# Patient Record
Sex: MALE
[De-identification: names, ages, dates, MRNs, and addresses within clinical notes are randomized per-mention and may not be internally consistent; named-entity substitution may affect disease eponyms.]

---

## 2017-12-26 ENCOUNTER — HOSPITAL ENCOUNTER (EMERGENCY)
Dept: HOSPITAL 31 - C.ER | Age: 62
Discharge: HOME | End: 2017-12-26
Payer: MEDICARE

## 2017-12-26 VITALS — HEART RATE: 62 BPM | DIASTOLIC BLOOD PRESSURE: 79 MMHG | SYSTOLIC BLOOD PRESSURE: 117 MMHG | OXYGEN SATURATION: 100 %

## 2017-12-26 VITALS — TEMPERATURE: 97.9 F | RESPIRATION RATE: 18 BRPM

## 2017-12-26 VITALS — BODY MASS INDEX: 27.3 KG/M2

## 2017-12-26 DIAGNOSIS — M54.5: ICD-10-CM

## 2017-12-26 DIAGNOSIS — N40.0: Primary | ICD-10-CM

## 2017-12-26 LAB
ALBUMIN/GLOB SERPL: 1.3 {RATIO} (ref 1–2.1)
ALP SERPL-CCNC: 83 U/L (ref 38–126)
ALT SERPL-CCNC: 54 U/L (ref 21–72)
AST SERPL-CCNC: 44 U/L (ref 17–59)
BASOPHILS # BLD AUTO: 0.1 K/UL (ref 0–0.2)
BASOPHILS NFR BLD: 0.6 % (ref 0–2)
BILIRUB SERPL-MCNC: 0.8 MG/DL (ref 0.2–1.3)
BILIRUB UR-MCNC: NEGATIVE MG/DL
BUN SERPL-MCNC: 16 MG/DL (ref 9–20)
CALCIUM SERPL-MCNC: 8.7 MG/DL (ref 8.6–10.4)
CHLORIDE SERPL-SCNC: 99 MMOL/L (ref 98–107)
CO2 SERPL-SCNC: 29 MMOL/L (ref 22–30)
EOSINOPHIL # BLD AUTO: 0.6 K/UL (ref 0–0.7)
EOSINOPHIL NFR BLD: 6.5 % (ref 0–4)
ERYTHROCYTE [DISTWIDTH] IN BLOOD BY AUTOMATED COUNT: 13.7 % (ref 11.5–14.5)
GLOBULIN SER-MCNC: 3.4 GM/DL (ref 2.2–3.9)
GLUCOSE SERPL-MCNC: 91 MG/DL (ref 75–110)
GLUCOSE UR STRIP-MCNC: NORMAL MG/DL
HCT VFR BLD CALC: 47.1 % (ref 35–51)
KETONES UR STRIP-MCNC: NEGATIVE MG/DL
LEUKOCYTE ESTERASE UR-ACNC: (no result) LEU/UL
LYMPHOCYTES # BLD AUTO: 2.2 K/UL (ref 1–4.3)
LYMPHOCYTES NFR BLD AUTO: 23 % (ref 20–40)
MCH RBC QN AUTO: 32 PG (ref 27–31)
MCHC RBC AUTO-ENTMCNC: 34.7 G/DL (ref 33–37)
MCV RBC AUTO: 92.4 FL (ref 80–94)
MONOCYTES # BLD: 1 K/UL (ref 0–0.8)
MONOCYTES NFR BLD: 10.5 % (ref 0–10)
NRBC BLD AUTO-RTO: 0 % (ref 0–2)
PH UR STRIP: 5 [PH] (ref 5–8)
PLATELET # BLD: 211 K/UL (ref 130–400)
PMV BLD AUTO: 8.4 FL (ref 7.2–11.7)
POTASSIUM SERPL-SCNC: 4.5 MMOL/L (ref 3.6–5.2)
PROT SERPL-MCNC: 7.9 G/DL (ref 6.3–8.3)
PROT UR STRIP-MCNC: NEGATIVE MG/DL
RBC # UR STRIP: (no result) /UL
RBC #/AREA URNS HPF: 1 /HPF (ref 0–3)
SODIUM SERPL-SCNC: 135 MMOL/L (ref 132–148)
SP GR UR STRIP: 1.01 (ref 1–1.03)
UROBILINOGEN UR-MCNC: NORMAL MG/DL (ref 0.2–1)
WBC # BLD AUTO: 9.5 K/UL (ref 4.8–10.8)
WBC #/AREA URNS HPF: 1 /HPF (ref 0–5)

## 2017-12-26 PROCEDURE — 81001 URINALYSIS AUTO W/SCOPE: CPT

## 2017-12-26 PROCEDURE — 87086 URINE CULTURE/COLONY COUNT: CPT

## 2017-12-26 PROCEDURE — 85025 COMPLETE CBC W/AUTO DIFF WBC: CPT

## 2017-12-26 PROCEDURE — 99284 EMERGENCY DEPT VISIT MOD MDM: CPT

## 2017-12-26 PROCEDURE — 74176 CT ABD & PELVIS W/O CONTRAST: CPT

## 2017-12-26 PROCEDURE — 80053 COMPREHEN METABOLIC PANEL: CPT

## 2017-12-26 PROCEDURE — 96360 HYDRATION IV INFUSION INIT: CPT

## 2017-12-26 NOTE — C.PDOC
History Of Present Illness


Pt c/o right lower back pain.


Time Seen by Provider: 12/26/17 13:55


Chief Complaint (Nursing): Back Pain


History Per: Patient


Onset/Duration Of Symptoms: Days (about 1-2 weeks)


Current Symptoms Are (Timing): Still Present


Quality Of Discomfort: "Pain"


Severity: Moderate


Associated Symptoms: denies: New Weakness, New Numbness


Exacerbating Factor(s): Turning, Movement


Additional History Per: Prior Records





Past Medical History


Reviewed: Historical Data, Nursing Documentation, Vital Signs


Vital Signs: 





 Last Vital Signs











Temp  97.9 F   12/26/17 12:50


 


Pulse  79   12/26/17 12:50


 


Resp  18   12/26/17 12:50


 


BP  132/85   12/26/17 12:50


 


Pulse Ox  97   12/26/17 12:50














- Medical History


PMH: Kidney Stones


Family History: States: Unknown Family Hx





- Social History


Hx Alcohol Use: No


Hx Substance Use: No





- Immunization History


Hx Tetanus Toxoid Vaccination: No


Hx Influenza Vaccination: No





Review Of Systems


Except As Marked, All Systems Reviewed And Found Negative.


Constitutional: Negative for: Fever


Cardiovascular: Negative for: Chest Pain


Respiratory: Negative for: Shortness of Breath


Gastrointestinal: Negative for: Abdominal Pain


Genitourinary: Negative for: Dysuria


Musculoskeletal: Positive for: Back Pain.  Negative for: Neck Pain, Leg Pain


Skin: Negative for: Rash


Neurological: Negative for: Weakness, Numbness





Physical Exam





- Physical Exam


Appears: Non-toxic, No Acute Distress


Skin: Normal Color, Warm, Dry, No Rash


Head: Atraumatic, Normacephalic


Eye(s): bilateral: Normal Inspection, PERRL, EOMI


Neck: Normal ROM, Supple


Cardiovascular: Rhythm Regular


Respiratory: Normal Breath Sounds, No Accessory Muscle Use


Gastrointestinal/Abdominal: Soft, No Tenderness


Back: No CVA Tenderness, No Vertebral Tenderness, Paraspinal Tenderness (right 

lower)


Extremity: Normal ROM


Neurological/Psych: Oriented x3, Normal Motor, Normal Sensation





ED Course And Treatment





- Laboratory Results


Result Diagrams: 


 12/26/17 14:32





 12/26/17 14:32


Lab Interpretation: No Acute Changes


O2 Sat by Pulse Oximetry: 97


Pulse Ox Interpretation: Normal





- CT Scan/US


  ** CT abd/pelv


Other Rad Studies (CT/US): Read By Radiologist, Radiology Report Reviewed


CT/US Interpretation: IMPRESSION:  No hydronephrosis or obstructing renal 

calculus.  Heterogeneous enlarged prostate gland measures approximately 4.3 x 

4.9 cm and contains calcifications.  Several scattered too small to 

characterize hepatic hypodensities.  Additional findings as above.





Progress





- Interventions


Interventions:: Observation, Intravenous fluid





- Medications Administered


Intravenous: NSAID





- Data Reviewed


Data Reviewed: Lab, Diagnostic imaging, Old records





- Patient Status


Patient status: Mostly improved





- Continuity of Care


Discussed patient case with:: Patient, ED Nurse





- Patient Plan


Patient Plan: Discharge, F/U with PCP, Continue present meds





Disposition


Counseled Patient/Family Regarding: Studies Performed, Diagnosis, Need For 

Followup, Rx Given





- Disposition


Referrals: 


Dany Fajardo DO [Staff Provider] - 


Disposition: HOME/ ROUTINE


Disposition Time: 16:30


Condition: IMPROVED


Additional Instructions: 


Follow up with your doctor for further evaluation and treatment. Return to the 

ER if you develop weakness, numbness, abdominal pain, worsening of symptoms or 

if you have any other concerns. 


Prescriptions: 


Naproxen [Naprosyn] 1 tab PO BID PRN #20 tab


 PRN Reason: Pain


Instructions:  Acute Low Back Pain (ED)





- Clinical Impression


Clinical Impression: 


 Right low back pain, Enlarged prostate

## 2017-12-26 NOTE — CT
PROCEDURE:  CT Abdomen and Pelvis without Oral or IV contrast.



HISTORY:

Right flank pain



COMPARISON:

None available.



TECHNIQUE:

Contiguous axial images of the abdomen and pelvis. No oral or IV 

contrast administered. Coronal and Sagittal reformats generated and 

reviewed. 



Radiation dose:



Total exam DLP = 540.20 mGy-cm.



This CT exam was performed using one or more of the following dose 

reduction techniques: Automated exposure control, adjustment of the 

mA and/or kV according to patient size, and/or use of iterative 

reconstruction technique.



FINDINGS:

There is limited evaluation of the solid organs without the 

administration of IV contrast.



LOWER THORAX:

No visible consolidation, pleural effusion, or pneumothorax.



LIVER:

Several scattered too small to characterize hepatic hypodensities.



GALLBLADDER AND BILE DUCTS:

Unremarkable unenhanced appearance. 



PANCREAS:

Unremarkable unenhanced appearance. 



SPLEEN:

11 mm probable splenule.  Otherwise unremarkable unenhanced 

appearance. 



ADRENALS:

Unremarkable unenhanced appearance. 



KIDNEYS AND URETERS:

No hydronephrosis or obstructing renal calculus.



BLADDER:

Mild thick walled urinary bladder may be exaggerated by under 

distension. 



REPRODUCTIVE:

Heterogeneous enlarged prostate gland measures approximately 4.3 x 

4.9 cm and contains calcifications. 



APPENDIX:

The appendix appears within normal limits of caliber. No secondary 

signs of acute appendicitis.



BOWEL:

The stomach is nondistended. 



Lack of oral contrast limits evaluation for bowel pathology.   The 

bowel loops appear within normal limits of caliber without evidence 

of intestinal obstruction.



PERITONEUM:

No significant free fluid. No definite free air.



LYMPH NODES:

No bulky lymphadenopathy identified.



VASCULATURE:

No aortic aneurysm. 



BONES:

Degenerative changes of the spine.



OTHER FINDINGS:

None. 



IMPRESSION:

No hydronephrosis or obstructing renal calculus.



Heterogeneous enlarged prostate gland measures approximately 4.3 x 

4.9 cm and contains calcifications. 



Several scattered too small to characterize hepatic hypodensities.



Additional findings as above.

## 2018-11-08 ENCOUNTER — HOSPITAL ENCOUNTER (EMERGENCY)
Dept: HOSPITAL 31 - C.ER | Age: 63
LOS: 1 days | Discharge: HOME | End: 2018-11-09
Payer: MEDICARE

## 2018-11-08 VITALS — RESPIRATION RATE: 20 BRPM

## 2018-11-08 VITALS — BODY MASS INDEX: 27.3 KG/M2

## 2018-11-08 DIAGNOSIS — F10.10: Primary | ICD-10-CM

## 2018-11-08 LAB
ALBUMIN SERPL-MCNC: 4.3 G/DL (ref 3.5–5)
ALBUMIN/GLOB SERPL: 1.5 {RATIO} (ref 1–2.1)
ALT SERPL-CCNC: 37 U/L (ref 21–72)
AST SERPL-CCNC: 30 U/L (ref 17–59)
BILIRUB UR-MCNC: NEGATIVE MG/DL
BUN SERPL-MCNC: 11 MG/DL (ref 9–20)
CALCIUM SERPL-MCNC: 8.1 MG/DL (ref 8.6–10.4)
ERYTHROCYTE [DISTWIDTH] IN BLOOD BY AUTOMATED COUNT: 13.7 % (ref 11.5–14.5)
GFR NON-AFRICAN AMERICAN: > 60
GLUCOSE UR STRIP-MCNC: NORMAL MG/DL
HGB BLD-MCNC: 15.7 G/DL (ref 12–18)
LEUKOCYTE ESTERASE UR-ACNC: (no result) LEU/UL
MCH RBC QN AUTO: 31.7 PG (ref 27–31)
MCHC RBC AUTO-ENTMCNC: 34.1 G/DL (ref 33–37)
MCV RBC AUTO: 92.8 FL (ref 80–94)
PH UR STRIP: 5 [PH] (ref 5–8)
PLATELET # BLD: 226 K/UL (ref 130–400)
PMV BLD AUTO: 7.8 FL (ref 7.2–11.7)
PROT UR STRIP-MCNC: NEGATIVE MG/DL
RBC # BLD AUTO: 4.96 MIL/UL (ref 4.4–5.9)
RBC # UR STRIP: (no result) /UL
SP GR UR STRIP: 1 (ref 1–1.03)
UROBILINOGEN UR-MCNC: NORMAL MG/DL (ref 0.2–1)
WBC # BLD AUTO: 10.5 K/UL (ref 4.8–10.8)

## 2018-11-08 PROCEDURE — 83992 ASSAY FOR PHENCYCLIDINE: CPT

## 2018-11-08 PROCEDURE — 36415 COLL VENOUS BLD VENIPUNCTURE: CPT

## 2018-11-08 PROCEDURE — 80358 DRUG SCREENING METHADONE: CPT

## 2018-11-08 PROCEDURE — 80349 CANNABINOIDS NATURAL: CPT

## 2018-11-08 PROCEDURE — 80353 DRUG SCREENING COCAINE: CPT

## 2018-11-08 PROCEDURE — 99285 EMERGENCY DEPT VISIT HI MDM: CPT

## 2018-11-08 PROCEDURE — 80324 DRUG SCREEN AMPHETAMINES 1/2: CPT

## 2018-11-08 PROCEDURE — 85027 COMPLETE CBC AUTOMATED: CPT

## 2018-11-08 PROCEDURE — 81001 URINALYSIS AUTO W/SCOPE: CPT

## 2018-11-08 PROCEDURE — 80053 COMPREHEN METABOLIC PANEL: CPT

## 2018-11-08 PROCEDURE — 96372 THER/PROPH/DIAG INJ SC/IM: CPT

## 2018-11-08 PROCEDURE — 82948 REAGENT STRIP/BLOOD GLUCOSE: CPT

## 2018-11-08 PROCEDURE — 80361 OPIATES 1 OR MORE: CPT

## 2018-11-08 PROCEDURE — 80345 DRUG SCREENING BARBITURATES: CPT

## 2018-11-08 PROCEDURE — 80346 BENZODIAZEPINES1-12: CPT

## 2018-11-08 PROCEDURE — 80320 DRUG SCREEN QUANTALCOHOLS: CPT

## 2018-11-08 NOTE — C.PDOC
History Of Present Illness


63 year old male presents to the ER after drinking tonight. Patient states he 

was unable to wait for detox in the morning so he drank a lot. While in the ER 

patient began yelling obscenities, cursing at staff and threatening to bite 

security, patient restraint and sedated for his own safety and the safety of 

others.





<Papi Elias - Last Filed: 11/10/18 07:43>





<Adalberto Calhoun - Last Filed: 11/09/18 05:55>


History Per: Patient


History/Exam Limitations: no limitations


Onset/Duration Of Symptoms: Hrs


Current Symptoms Are (Timing): Still Present


Suicide/Self Injury Attempted (Context): None


Modifying Factor(s): Alcohol


Associated Symptoms: denies: Depression, Suicidal Thoughts


Involuntary Hold By: None


Recent travel outside of the [place]: No





<Papi Elias - Last Filed: 11/10/18 07:43>


Time Seen by Provider: 11/08/18 23:05


Chief Complaint (Nursing): Substance Abuse





Past Medical History


Vital Signs: 





                                Last Vital Signs











Temp  97 F L  11/09/18 04:00


 


Pulse  68   11/09/18 04:00


 


Resp  20   11/09/18 04:00


 


BP  108/71   11/09/18 04:00


 


Pulse Ox  100   11/09/18 04:00














<Adalberto Calhoun - Last Filed: 11/09/18 05:55>


Reviewed: Historical Data, Nursing Documentation, Vital Signs


Vital Signs: 





                                Last Vital Signs











Temp  97.8 F   11/08/18 22:02


 


Pulse  73   11/08/18 22:02


 


Resp  20   11/08/18 22:02


 


BP  109/72   11/08/18 22:02


 


Pulse Ox  96   11/08/18 22:02














- Medical History


PMH: HTN, Kidney Stones, Chronic Kidney Disease


Family History: States: Unknown Family Hx





- Social History


Hx Alcohol Use: No


Hx Substance Use: No





- Immunization History


Hx Tetanus Toxoid Vaccination: No


Hx Influenza Vaccination: No


Hx Pneumococcal Vaccination: No





<Papi Elias - Last Filed: 11/10/18 07:43>





Review Of Systems


Review Of Systems: ROS cannot be obtained secondary to pt's inabilty to answer 

questions. (Yelling, cursing, refusing to cooperate)





<Papi Elias - Last Filed: 11/10/18 07:43>





Physical Exam





- Physical Exam


Appears: Non-toxic, Other (ETOH on breath)


Skin: Normal Color, Warm, Dry


Head: Atraumatic, Normacephalic


Eye(s): bilateral: Normal Inspection


Nose: Normal


Oral Mucosa: Moist


Neck: Normal, No Midline Cervical Tenderness, No Paracervical Tenderness, Supple


Chest: Symmetrical, No Tenderness


Cardiovascular: Rhythm Regular


Respiratory: Normal Breath Sounds, No Rales, No Rhonchi, No Wheezing


Gastrointestinal/Abdominal: Soft, No Tenderness


Extremity: Normal ROM (x4)


Neurological/Psych: Oriented x3, Normal Speech





<Papi Elias - Last Filed: 11/10/18 07:43>





ED Course And Treatment





- Laboratory Results


Result Diagrams: 


                                 11/08/18 22:32





                                 11/08/18 22:32





<Adalberto Calhoun - Last Filed: 11/09/18 05:55>





- Laboratory Results


Result Diagrams: 


                                 11/08/18 22:32





                                 11/08/18 22:32


O2 Sat by Pulse Oximetry: 96 (Room air)


Pulse Ox Interpretation: Normal





<Papi Elias - Last Filed: 11/10/18 07:43>





Medical Decision Making


Medical Decision Making: 





signed over @ 0100


pending sobriety


pt intoxicated, etoh 323


agressive with staff


unable to be redirected after multiple attempts


restrained and sedated for safety


0600: clinically sober, ok for d/c. 





<Adalberto Calhoun - Last Filed: 11/09/18 05:55>


Medical Decision Making: 





Plan:


* Blood work


* Urinalysis


* Ativan


* Geodon





sedated in er on arrival. endorsed ot night shift pending sobrieyt. 











<Papi Elias - Last Filed: 11/10/18 07:43>





Disposition


Doctor Will See Patient In The: Office


Counseled Patient/Family Regarding: Studies Performed, Diagnosis





- Disposition


Disposition Time: 05:56





<Adalberto Calhoun - Last Filed: 11/09/18 05:55>





<Papi Elias - Last Filed: 11/10/18 07:43>





- Disposition


Referrals: 


[facility] [Outside]


[facility] [Outside]


[facility] [Outside]


[facility] [Outside]


Disposition: HOME/ ROUTINE


Condition: GOOD


Additional Instructions: 


seek outpatient resources for your alcoholism


Instructions:  Alcohol Abuse and Alcoholism (DC)


Forms:  [facility] (English)





- Clinical Impression


Clinical Impression: 


 Alcohol abuse








- Scribe Statement


The provider has reviewed the documentation as recorded by the Scribe





Timmy Rubio





All medical record entries made by the Scribe were at my direction and 

personally dictated by me. I have reviewed the chart and agree that the record 

accurately reflects my personal performance of the history, physical exam, 

medical decision making, and the department course for this patient. I have also

 personally directed, reviewed, and agree with the discharge instructions and 

disposition.





<Papi Elias - Last Filed: 11/10/18 07:43>

## 2018-11-09 VITALS — HEART RATE: 81 BPM | TEMPERATURE: 98 F | DIASTOLIC BLOOD PRESSURE: 69 MMHG | SYSTOLIC BLOOD PRESSURE: 111 MMHG

## 2018-11-10 ENCOUNTER — HOSPITAL ENCOUNTER (INPATIENT)
Dept: HOSPITAL 31 - C.ER | Age: 63
LOS: 1 days | Discharge: LEFT BEFORE DISCHARGE | DRG: 894 | End: 2018-11-11
Attending: PSYCHIATRY & NEUROLOGY | Admitting: PSYCHIATRY & NEUROLOGY
Payer: MEDICARE

## 2018-11-10 VITALS — BODY MASS INDEX: 27.3 KG/M2

## 2018-11-10 VITALS — OXYGEN SATURATION: 96 %

## 2018-11-10 DIAGNOSIS — I12.9: ICD-10-CM

## 2018-11-10 DIAGNOSIS — F10.10: Primary | ICD-10-CM

## 2018-11-10 DIAGNOSIS — N18.9: ICD-10-CM

## 2018-11-10 LAB
ALBUMIN SERPL-MCNC: 4.4 G/DL (ref 3.5–5)
ALBUMIN/GLOB SERPL: 1.4 {RATIO} (ref 1–2.1)
ALT SERPL-CCNC: 49 U/L (ref 21–72)
AST SERPL-CCNC: 52 U/L (ref 17–59)
BASOPHILS # BLD AUTO: 0.1 K/UL (ref 0–0.2)
BASOPHILS NFR BLD: 0.9 % (ref 0–2)
BILIRUB UR-MCNC: NEGATIVE MG/DL
BUN SERPL-MCNC: 13 MG/DL (ref 9–20)
CALCIUM SERPL-MCNC: 8.9 MG/DL (ref 8.6–10.4)
EOSINOPHIL # BLD AUTO: 0.5 K/UL (ref 0–0.7)
EOSINOPHIL NFR BLD: 5.8 % (ref 0–4)
ERYTHROCYTE [DISTWIDTH] IN BLOOD BY AUTOMATED COUNT: 13.8 % (ref 11.5–14.5)
GFR NON-AFRICAN AMERICAN: > 60
GLUCOSE UR STRIP-MCNC: NORMAL MG/DL
HGB BLD-MCNC: 15.5 G/DL (ref 12–18)
LEUKOCYTE ESTERASE UR-ACNC: (no result) LEU/UL
LYMPHOCYTES # BLD AUTO: 1.9 K/UL (ref 1–4.3)
LYMPHOCYTES NFR BLD AUTO: 20.9 % (ref 20–40)
MCH RBC QN AUTO: 32.1 PG (ref 27–31)
MCHC RBC AUTO-ENTMCNC: 34.6 G/DL (ref 33–37)
MCV RBC AUTO: 92.7 FL (ref 80–94)
MONOCYTES # BLD: 0.9 K/UL (ref 0–0.8)
MONOCYTES NFR BLD: 9.6 % (ref 0–10)
NEUTROPHILS # BLD: 5.9 K/UL (ref 1.8–7)
NEUTROPHILS NFR BLD AUTO: 62.8 % (ref 50–75)
NRBC BLD AUTO-RTO: 0 % (ref 0–2)
PH UR STRIP: 5 [PH] (ref 5–8)
PLATELET # BLD: 208 K/UL (ref 130–400)
PMV BLD AUTO: 7.8 FL (ref 7.2–11.7)
PROT UR STRIP-MCNC: NEGATIVE MG/DL
RBC # BLD AUTO: 4.82 MIL/UL (ref 4.4–5.9)
RBC # UR STRIP: (no result) /UL
SP GR UR STRIP: 1.02 (ref 1–1.03)
SQUAMOUS EPITHIAL: < 1 /HPF (ref 0–5)
UROBILINOGEN UR-MCNC: NORMAL MG/DL (ref 0.2–1)
WBC # BLD AUTO: 9.3 K/UL (ref 4.8–10.8)

## 2018-11-10 NOTE — PCM.BM
Treatment Plan Problems





- Problems identified on initial assessmt


  ** potiential for automonic instability related to alcohol withdrawal


Date Initiated: 11/10/18


Time Initiated: 15:36


Assessment reference: NA


Status: Active





Treatment assets and liabiliti


Patient Assests: cooperative, motivated, ADL independent, cognitively intact


Patient Liabilities: substance abuse, medical problems





- Milieu Protocol


Maintain good personal hygiene: daily Encourage regular showers, daily Remind 

patient to perform daily oral care, daily Assist patient to perform ADL's


Maintain personal safety: every shift Educate patient to report safety concerns 

to staff, every shift Monitor environment for contraband/sharps


Medication safety: Monitor for expected outcome, potential side effects: every 

shift, Assess barriers to learning: every shift, Assess readiness for medication

education: every shift

## 2018-11-10 NOTE — C.PDOC
History Of Present Illness


62 y/o male presents to ED requesting alcohol detox. Pt was seen here previously

requesting same. Otherwise, denies SI, HI, or any physical complaints at this 

time. 





Time Seen by Provider: 11/10/18 09:16


Chief Complaint (Nursing): Psychiatric Evaluation


History Per: Patient


History/Exam Limitations: no limitations





Past Medical History


Reviewed: Historical Data, Nursing Documentation, Vital Signs


Vital Signs: 





                                Last Vital Signs











Temp  97.8 F   11/10/18 09:14


 


Pulse  100 H  11/10/18 09:14


 


Resp  20   11/10/18 09:14


 


BP  150/99 H  11/10/18 09:14


 


Pulse Ox  96   11/10/18 09:14














- Medical History


PMH: HTN, Kidney Stones, Chronic Kidney Disease


Family History: States: Unknown Family Hx





- Social History


Hx Alcohol Use: No


Hx Substance Use: No





- Immunization History


Hx Tetanus Toxoid Vaccination: No


Hx Influenza Vaccination: No


Hx Pneumococcal Vaccination: No





Review Of Systems


Except As Marked, All Systems Reviewed And Found Negative.


Constitutional: Negative for: Fever, Chills


Cardiovascular: Negative for: Chest Pain, Palpitations


Respiratory: Negative for: Cough, Shortness of Breath





Physical Exam





- Physical Exam


Appears: Non-toxic, No Acute Distress


Skin: Normal Color, Warm, Dry


Head: Atraumatic, Normacephalic


Eye(s): bilateral: Normal Inspection


Oral Mucosa: Moist


Neck: Normal ROM, Supple


Cardiovascular: Rhythm Regular, No Murmur


Respiratory: Normal Breath Sounds, No Rales, No Rhonchi, No Wheezing


Gastrointestinal/Abdominal: Soft, No Tenderness


Extremity: Normal ROM


Neurological/Psych: Oriented x3, Normal Speech





ED Course And Treatment





- Laboratory Results


Result Diagrams: 


                                 11/10/18 10:09





                                 11/10/18 10:09


O2 Sat by Pulse Oximetry: 96 (RA)


Pulse Ox Interpretation: Normal





Medical Decision Making


Medical Decision Making: 


Plan:


Blood work


Urinalysis





Pt is medically cleared.








accepted crisis. no medical complaint in er. 





Disposition





- Disposition


Disposition: HOSPITALIZED


Disposition Time: 14:00


Condition: STABLE





- Clinical Impression


Clinical Impression: 


 Alcohol abuse








- Scribe Statement


The provider has reviewed the documentation as recorded by the Scribe





KP





All medical record entries made by the Scribe were at my direction and 

personally dictated by me. I have reviewed the chart and agree that the record 

accurately reflects my personal performance of the history, physical exam, 

medical decision making, and the department course for this patient. I have also

personally directed, reviewed, and agree with the discharge instructions and 

disposition.





Decision To Admit





- Pt Status Changed To:


Hospital Disposition Of: Inpatient





- Admit Certification


Admit to Inpatient:: After my assessment, the patient will require 

hospitalization for at least two midnights.  This is because of the severity of 

symptoms shown, intensity of services needed, and/or the medical risk in this 

patient being treated as an outpatient.





- InPatient:


Physician Admission Certification: I certify that this patient requires 2 or 

more midnights of care for the following reason:: needs detox





- .


Bed Request Type: Detox


Admitting Physician: Sherry Velasquez


Patient Diagnosis: 


 Alcohol abuse

## 2018-11-11 VITALS
RESPIRATION RATE: 20 BRPM | TEMPERATURE: 97.7 F | OXYGEN SATURATION: 95 % | DIASTOLIC BLOOD PRESSURE: 81 MMHG | SYSTOLIC BLOOD PRESSURE: 123 MMHG | HEART RATE: 66 BPM